# Patient Record
Sex: FEMALE | Race: AMERICAN INDIAN OR ALASKA NATIVE | HISPANIC OR LATINO | Employment: FULL TIME | URBAN - METROPOLITAN AREA
[De-identification: names, ages, dates, MRNs, and addresses within clinical notes are randomized per-mention and may not be internally consistent; named-entity substitution may affect disease eponyms.]

---

## 2023-01-10 ENCOUNTER — TELEPHONE (OUTPATIENT)
Dept: PRIMARY CARE CLINIC | Facility: CLINIC | Age: 28
End: 2023-01-10
Payer: MEDICAID
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
## 2023-03-28 DIAGNOSIS — K80.20 CHOLELITHIASIS: Primary | ICD-10-CM

## 2023-04-26 ENCOUNTER — OFFICE VISIT (OUTPATIENT)
Dept: SURGERY | Facility: CLINIC | Age: 28
End: 2023-04-26
Payer: COMMERCIAL

## 2023-04-26 DIAGNOSIS — K80.20 CALCULUS OF GALLBLADDER WITHOUT CHOLECYSTITIS WITHOUT OBSTRUCTION: ICD-10-CM

## 2023-04-26 PROCEDURE — 99203 PR OFFICE/OUTPT VISIT, NEW, LEVL III, 30-44 MIN: ICD-10-PCS | Mod: S$GLB,,, | Performed by: SURGERY

## 2023-04-26 PROCEDURE — 1160F PR REVIEW ALL MEDS BY PRESCRIBER/CLIN PHARMACIST DOCUMENTED: ICD-10-PCS | Mod: CPTII,S$GLB,, | Performed by: SURGERY

## 2023-04-26 PROCEDURE — 99203 OFFICE O/P NEW LOW 30 MIN: CPT | Mod: S$GLB,,, | Performed by: SURGERY

## 2023-04-26 PROCEDURE — 1159F PR MEDICATION LIST DOCUMENTED IN MEDICAL RECORD: ICD-10-PCS | Mod: CPTII,S$GLB,, | Performed by: SURGERY

## 2023-04-26 PROCEDURE — 1160F RVW MEDS BY RX/DR IN RCRD: CPT | Mod: CPTII,S$GLB,, | Performed by: SURGERY

## 2023-04-26 PROCEDURE — 1159F MED LIST DOCD IN RCRD: CPT | Mod: CPTII,S$GLB,, | Performed by: SURGERY

## 2023-04-26 RX ORDER — PROPRANOLOL HYDROCHLORIDE 60 MG/1
CAPSULE, EXTENDED RELEASE ORAL
COMMUNITY
Start: 2023-02-24 | End: 2023-12-04

## 2023-04-26 RX ORDER — KETOCONAZOLE 20 MG/G
CREAM TOPICAL
COMMUNITY
Start: 2023-03-06

## 2023-04-26 RX ORDER — SUMATRIPTAN 50 MG/1
TABLET, FILM COATED ORAL
COMMUNITY
Start: 2023-04-06 | End: 2024-03-26 | Stop reason: SDUPTHER

## 2023-04-26 RX ORDER — IPRATROPIUM BROMIDE 42 UG/1
SPRAY, METERED NASAL
COMMUNITY
Start: 2023-02-24 | End: 2023-12-04

## 2023-04-26 NOTE — PROGRESS NOTES
History & Physical    SUBJECTIVE:     History of Present Illness:    27-year-old female referred by Dr. Silvio Camilo for symptomatic cholelithiasis.  Patient apparently has had over the last year proximally 6 episodes of right upper quadrant pain with associated nausea and some vomiting.  Denies fever chills or jaundice.  Apparently had ultrasound which showed cholelithiasis.  The ultrasound report is not available at this time with her visit.  We will obtain this report from the referring primary care doctor.    Chief Complaint   Patient presents with    Gall Bladder Problem         Review of patient's allergies indicates:  Review of patient's allergies indicates:   Allergen Reactions    Corticosteroids (glucocorticoids)        Current Outpatient Medications on File Prior to Visit   Medication Sig Dispense Refill    ipratropium (ATROVENT) 42 mcg (0.06 %) nasal spray       ketoconazole (NIZORAL) 2 % cream       propranoloL (INDERAL LA) 60 MG 24 hr capsule       sumatriptan (IMITREX) 50 MG tablet        No current facility-administered medications on file prior to visit.       History reviewed. No pertinent past medical history.  Past Surgical History:   Procedure Laterality Date    PINNING-CLOSED-ARM Right     TONSILLECTOMY       Family History   Problem Relation Age of Onset    Miscarriages / Stillbirths Mother     Hypertension Mother     Stroke Mother     Diabetes Mother     Cancer Mother        Social History     Socioeconomic History    Marital status: Single   Tobacco Use    Smoking status: Never    Smokeless tobacco: Never          Review of Systems   Constitutional: Negative.    Respiratory: Negative.     Cardiovascular: Negative.    Gastrointestinal:  Positive for abdominal pain and vomiting.   Genitourinary: Negative.    Musculoskeletal: Negative.    Neurological:  Positive for headaches.   Endo/Heme/Allergies: Negative.      OBJECTIVE:     There were no vitals filed for this visit.              Physical  Exam:  Physical Exam  Constitutional:       Appearance: Normal appearance.   Eyes:      Pupils: Pupils are equal, round, and reactive to light.   Cardiovascular:      Rate and Rhythm: Normal rate and regular rhythm.   Pulmonary:      Effort: Pulmonary effort is normal.   Abdominal:      General: Abdomen is flat. Bowel sounds are normal.      Palpations: Abdomen is soft.      Tenderness: There is no abdominal tenderness.   Musculoskeletal:         General: No swelling. Normal range of motion.   Skin:     General: Skin is warm.   Neurological:      General: No focal deficit present.      Mental Status: She is alert and oriented to person, place, and time.      Cranial Nerves: No cranial nerve deficit.   Psychiatric:         Mood and Affect: Mood normal.         Behavior: Behavior normal.           ASSESSMENT/PLAN:   Cholelithiasis with biliary colic symptoms  PLAN:  Recommend laparoscopic cholecystectomy.  We will obtain ultrasound report to confirm cholelithiasis.  Patient wants to schedule surgery in December 21st if possible.  I did tell her that if she has worsening symptoms to please call us and we can get surgery done before then any time.  We will plan to see her back about a week before her proposed surgery date and December

## 2023-10-12 ENCOUNTER — TELEPHONE (OUTPATIENT)
Dept: GASTROENTEROLOGY | Facility: CLINIC | Age: 28
End: 2023-10-12
Payer: COMMERCIAL

## 2023-10-12 NOTE — TELEPHONE ENCOUNTER
----- Message from Danni Jay MA sent at 10/11/2023  2:26 PM CDT -----    ----- Message -----  From: Adriana Chaudhary  Sent: 10/11/2023   1:26 PM CDT  To: [facility] Staff    Type:  Patient Returning Call    Who Called:Yasmeen Ren  Who Left Message for Patient:Justyna   Does the patient know what this is regarding?:-  Would the patient rather a call back or a response via [facility]?    Best Call Back Number:[phone]    Additional Information: returning a missed call

## 2023-11-27 ENCOUNTER — TELEPHONE (OUTPATIENT)
Dept: SURGERY | Facility: CLINIC | Age: 28
End: 2023-11-27
Payer: COMMERCIAL

## 2023-11-27 DIAGNOSIS — K80.20 CALCULUS OF GALLBLADDER WITHOUT CHOLECYSTITIS WITHOUT OBSTRUCTION: Primary | ICD-10-CM

## 2023-11-27 NOTE — TELEPHONE ENCOUNTER
Returned call and left message to give office a call back.   ----- Message from Adriana Chaudhary sent at 11/27/2023 10:25 AM CST -----  Type:  Needs Medical Advice    Who Called: Yasmeen Ren    Symptoms (please be specific): -   How long has patient had these symptoms:  -  Pharmacy name and phone #:  -  Would the patient rather a call back or a response via [facility]?    Best Call Back Number: [phone]    Additional Information:  pt needs to speak w/ nurse needs to give update please call

## 2023-12-04 ENCOUNTER — OFFICE VISIT (OUTPATIENT)
Dept: SURGERY | Facility: CLINIC | Age: 28
End: 2023-12-04
Payer: COMMERCIAL

## 2023-12-04 DIAGNOSIS — K80.10 CALCULUS OF GALLBLADDER WITH CHOLECYSTITIS WITHOUT BILIARY OBSTRUCTION, UNSPECIFIED CHOLECYSTITIS ACUITY: Primary | ICD-10-CM

## 2023-12-04 LAB
ALBUMIN SERPL BCP-MCNC: 4.1 G/DL (ref 3.4–5)
ALBUMIN/GLOBULIN RATIO: 1.11 RATIO (ref 1.1–1.8)
ALP SERPL-CCNC: 89 U/L (ref 46–116)
ALT SERPL W P-5'-P-CCNC: 23 U/L (ref 12–78)
ANION GAP SERPL CALC-SCNC: 12 MMOL/L (ref 3–11)
AST SERPL-CCNC: 20 U/L (ref 15–37)
BASOPHILS NFR BLD: 0.5 % (ref 0–3)
BILIRUB CONJ+UNCONJ SERPL-MCNC: 0.5 MG/DL (ref 0–0.7)
BILIRUB SERPL-MCNC: 0.6 MG/DL (ref 0–1)
BILIRUBIN DIRECT+TOT PNL SERPL-MCNC: 0.1 MG/DL (ref 0–0.3)
BUN SERPL-MCNC: 11 MG/DL (ref 7–18)
BUN/CREAT SERPL: 12.79 RATIO (ref 7–18)
CALCIUM SERPL-MCNC: 8.8 MG/DL (ref 8.8–10.5)
CHLORIDE SERPL-SCNC: 101 MMOL/L (ref 100–108)
CO2 SERPL-SCNC: 25 MMOL/L (ref 21–32)
CREAT SERPL-MCNC: 0.86 MG/DL (ref 0.55–1.02)
EOSINOPHIL NFR BLD: 0.8 % (ref 1–3)
ERYTHROCYTE [DISTWIDTH] IN BLOOD BY AUTOMATED COUNT: 13 % (ref 12.5–18)
GFR ESTIMATION: > 60
GLOBULIN: 3.7 G/DL (ref 2.3–3.5)
GLUCOSE SERPL-MCNC: 99 MG/DL (ref 70–110)
HCT VFR BLD AUTO: 43.9 % (ref 37–47)
HGB BLD-MCNC: 14.8 G/DL (ref 12–16)
LYMPHOCYTES NFR BLD: 28.8 % (ref 25–40)
MCH RBC QN AUTO: 29.6 PG (ref 27–31.2)
MCHC RBC AUTO-ENTMCNC: 33.7 G/DL (ref 31.8–35.4)
MCV RBC AUTO: 87.8 FL (ref 80–97)
MONOCYTES NFR BLD: 5.8 % (ref 1–15)
NEUTROPHILS # BLD AUTO: 7.52 10*3/UL (ref 1.8–7.7)
NEUTROPHILS NFR BLD: 63.7 % (ref 37–80)
NUCLEATED RED BLOOD CELLS: 0 %
PLATELETS: 308 10*3/UL (ref 142–424)
POTASSIUM SERPL-SCNC: 3.4 MMOL/L (ref 3.6–5.2)
PROT SERPL-MCNC: 7.8 G/DL (ref 6.4–8.2)
RBC # BLD AUTO: 5 10*6/UL (ref 4.2–5.4)
SODIUM BLD-SCNC: 138 MMOL/L (ref 135–145)
WBC # BLD: 11.8 10*3/UL (ref 4.6–10.2)

## 2023-12-04 PROCEDURE — 99213 PR OFFICE/OUTPT VISIT, EST, LEVL III, 20-29 MIN: ICD-10-PCS | Mod: S$GLB,,, | Performed by: SURGERY

## 2023-12-04 PROCEDURE — 1159F PR MEDICATION LIST DOCUMENTED IN MEDICAL RECORD: ICD-10-PCS | Mod: CPTII,S$GLB,, | Performed by: SURGERY

## 2023-12-04 PROCEDURE — 99213 OFFICE O/P EST LOW 20 MIN: CPT | Mod: S$GLB,,, | Performed by: SURGERY

## 2023-12-04 PROCEDURE — 1160F PR REVIEW ALL MEDS BY PRESCRIBER/CLIN PHARMACIST DOCUMENTED: ICD-10-PCS | Mod: CPTII,S$GLB,, | Performed by: SURGERY

## 2023-12-04 PROCEDURE — 1160F RVW MEDS BY RX/DR IN RCRD: CPT | Mod: CPTII,S$GLB,, | Performed by: SURGERY

## 2023-12-04 PROCEDURE — 1159F MED LIST DOCD IN RCRD: CPT | Mod: CPTII,S$GLB,, | Performed by: SURGERY

## 2023-12-04 RX ORDER — LEVOCETIRIZINE DIHYDROCHLORIDE 5 MG/1
TABLET, FILM COATED ORAL
COMMUNITY
Start: 2023-07-24

## 2023-12-04 RX ORDER — PROMETHAZINE HYDROCHLORIDE 25 MG/1
TABLET ORAL
COMMUNITY
Start: 2023-09-06

## 2023-12-04 NOTE — PROGRESS NOTES
History & Physical    SUBJECTIVE:     History of Present Illness:    28-year-old female whom I saw April this year with symptomatic cholelithiasis.  Her surgery is scheduled for December 21st and she is here today for her preop examination anti answer any questions.  She is been fairly symptomatic since I last saw her having multiple episodes of biliary colic.  No history of jaundice, pancreatitis, fever or chills    Chief Complaint   Patient presents with    Gall Bladder Problem         Review of patient's allergies indicates:  Review of patient's allergies indicates:   Allergen Reactions    Adhesive     Corticosteroids (glucocorticoids)        Current Outpatient Medications on File Prior to Visit   Medication Sig Dispense Refill    levocetirizine (XYZAL) 5 MG tablet       promethazine (PHENERGAN) 25 MG tablet       ketoconazole (NIZORAL) 2 % cream       sumatriptan (IMITREX) 50 MG tablet       [DISCONTINUED] ipratropium (ATROVENT) 42 mcg (0.06 %) nasal spray       [DISCONTINUED] propranoloL (INDERAL LA) 60 MG 24 hr capsule        No current facility-administered medications on file prior to visit.       No past medical history on file.  Past Surgical History:   Procedure Laterality Date    PINNING-CLOSED-ARM Right     TONSILLECTOMY       Family History   Problem Relation Age of Onset    Miscarriages / Stillbirths Mother     Hypertension Mother     Stroke Mother     Diabetes Mother     Cancer Mother        Social History     Socioeconomic History    Marital status: Single   Tobacco Use    Smoking status: Never    Smokeless tobacco: Never          Review of Systems   Constitutional: Negative.    Cardiovascular: Negative.    Gastrointestinal:  Positive for abdominal pain.   Genitourinary: Negative.    Musculoskeletal: Negative.    Neurological: Negative.    Endo/Heme/Allergies: Negative.    Psychiatric/Behavioral: Negative.         OBJECTIVE:     There were no vitals filed for this visit.              Physical  Exam:  Physical Exam  Constitutional:       Appearance: Normal appearance.   HENT:      Head: Normocephalic.   Eyes:      Pupils: Pupils are equal, round, and reactive to light.   Cardiovascular:      Rate and Rhythm: Normal rate and regular rhythm.   Pulmonary:      Effort: Pulmonary effort is normal.      Breath sounds: Normal breath sounds.   Abdominal:      General: Abdomen is flat. Bowel sounds are normal.      Palpations: Abdomen is soft.   Musculoskeletal:         General: No swelling. Normal range of motion.      Cervical back: Normal range of motion.   Skin:     General: Skin is warm and dry.      Coloration: Skin is not jaundiced.   Neurological:      General: No focal deficit present.      Mental Status: She is alert and oriented to person, place, and time.      Cranial Nerves: No cranial nerve deficit.   Psychiatric:         Mood and Affect: Mood normal.         Behavior: Behavior normal.         Thought Content: Thought content normal.             ASSESSMENT/PLAN:   Cholelithiasis with chronic cholecystitis, symptomatic biliary colic  PLAN:  Discussed in detail laparoscopic cholecystectomy in the expected outcome and recovery.  Employment paperwork is being filled out accordingly.  Surgery scheduled for December 21, 2023.  All questions answered

## 2023-12-21 ENCOUNTER — OUTSIDE PLACE OF SERVICE (OUTPATIENT)
Dept: SURGERY | Facility: CLINIC | Age: 28
End: 2023-12-21
Payer: COMMERCIAL

## 2023-12-21 LAB — B-HCG UR QL: NEGATIVE

## 2023-12-21 PROCEDURE — 47562 LAPAROSCOPIC CHOLECYSTECTOMY: CPT | Mod: ,,, | Performed by: SURGERY

## 2023-12-21 PROCEDURE — 47562 PR LAP,CHOLECYSTECTOMY: ICD-10-PCS | Mod: ,,, | Performed by: SURGERY

## 2023-12-29 ENCOUNTER — TELEPHONE (OUTPATIENT)
Dept: PAIN MEDICINE | Facility: CLINIC | Age: 28
End: 2023-12-29
Payer: COMMERCIAL

## 2023-12-29 NOTE — TELEPHONE ENCOUNTER
----- Message from Adriana Chaudhary sent at 12/29/2023 11:57 AM CST -----  Type:  Needs Medical Advice    Who Called: Yasmeen Ren  Symptoms (please be specific): -   How long has patient had these symptoms:  -  Pharmacy name and phone #:  -  Would the patient rather a call back or a response via [facility]?    Best Call Back Number: [phone]    Additional Information:  pt wants to know when her post op appt will be please call

## 2023-12-29 NOTE — TELEPHONE ENCOUNTER
I returned  call to inquire how I could help her? She replied by scheduling her 2 week post op appointment with Dr. Varghese. Which was scheduled with success.

## 2024-01-08 ENCOUNTER — OFFICE VISIT (OUTPATIENT)
Dept: SURGERY | Facility: CLINIC | Age: 29
End: 2024-01-08
Payer: COMMERCIAL

## 2024-01-08 DIAGNOSIS — Z98.890 POST-OPERATIVE STATE: Primary | ICD-10-CM

## 2024-01-08 PROCEDURE — 1159F MED LIST DOCD IN RCRD: CPT | Mod: CPTII,S$GLB,, | Performed by: SURGERY

## 2024-01-08 PROCEDURE — 1160F RVW MEDS BY RX/DR IN RCRD: CPT | Mod: CPTII,S$GLB,, | Performed by: SURGERY

## 2024-01-08 PROCEDURE — 99024 POSTOP FOLLOW-UP VISIT: CPT | Mod: S$GLB,,, | Performed by: SURGERY

## 2024-01-08 NOTE — PROGRESS NOTES
HPI:  Postop revisit status post laparoscopic cholecystectomy.  Still complains of some incisional epigastric pain.  Appetite not as good as it was.  No nausea vomiting or diarrhea.    PHYSICAL EXAM:  Abdomen is soft and nontender with active bowel sounds.  Incisions are healing well.  Subcuticular suture has come out and is no longer present  ASSESSMENT:    Stable postop  PLAN:      Revisit as needed.  Return to work Tuesday January 23rd no restriction

## 2024-03-26 ENCOUNTER — OFFICE VISIT (OUTPATIENT)
Dept: PRIMARY CARE CLINIC | Facility: CLINIC | Age: 29
End: 2024-03-26
Payer: COMMERCIAL

## 2024-03-26 VITALS
HEART RATE: 89 BPM | WEIGHT: 167.31 LBS | TEMPERATURE: 99 F | RESPIRATION RATE: 18 BRPM | BODY MASS INDEX: 33.73 KG/M2 | SYSTOLIC BLOOD PRESSURE: 102 MMHG | DIASTOLIC BLOOD PRESSURE: 71 MMHG | OXYGEN SATURATION: 98 % | HEIGHT: 59 IN

## 2024-03-26 DIAGNOSIS — J30.2 SEASONAL ALLERGIES: ICD-10-CM

## 2024-03-26 DIAGNOSIS — Z00.00 ANNUAL PHYSICAL EXAM: Primary | ICD-10-CM

## 2024-03-26 DIAGNOSIS — Z13.1 DIABETES MELLITUS SCREENING: ICD-10-CM

## 2024-03-26 DIAGNOSIS — R35.0 URINE FREQUENCY: ICD-10-CM

## 2024-03-26 DIAGNOSIS — Z11.4 SCREENING FOR HIV (HUMAN IMMUNODEFICIENCY VIRUS): ICD-10-CM

## 2024-03-26 DIAGNOSIS — G43.711 CHRONIC MIGRAINE WITHOUT AURA, WITH INTRACTABLE MIGRAINE, SO STATED, WITH STATUS MIGRAINOSUS: Chronic | ICD-10-CM

## 2024-03-26 DIAGNOSIS — E55.9 VITAMIN D DEFICIENCY: ICD-10-CM

## 2024-03-26 DIAGNOSIS — Z13.220 SCREENING CHOLESTEROL LEVEL: ICD-10-CM

## 2024-03-26 DIAGNOSIS — Z11.59 NEED FOR HEPATITIS C SCREENING TEST: ICD-10-CM

## 2024-03-26 DIAGNOSIS — Z13.29 THYROID DISORDER SCREEN: ICD-10-CM

## 2024-03-26 PROCEDURE — 3078F DIAST BP <80 MM HG: CPT | Mod: CPTII,S$GLB,, | Performed by: NURSE PRACTITIONER

## 2024-03-26 PROCEDURE — 3074F SYST BP LT 130 MM HG: CPT | Mod: CPTII,S$GLB,, | Performed by: NURSE PRACTITIONER

## 2024-03-26 PROCEDURE — 99204 OFFICE O/P NEW MOD 45 MIN: CPT | Mod: S$GLB,,, | Performed by: NURSE PRACTITIONER

## 2024-03-26 PROCEDURE — 3008F BODY MASS INDEX DOCD: CPT | Mod: CPTII,S$GLB,, | Performed by: NURSE PRACTITIONER

## 2024-03-26 PROCEDURE — 1159F MED LIST DOCD IN RCRD: CPT | Mod: CPTII,S$GLB,, | Performed by: NURSE PRACTITIONER

## 2024-03-26 PROCEDURE — 1160F RVW MEDS BY RX/DR IN RCRD: CPT | Mod: CPTII,S$GLB,, | Performed by: NURSE PRACTITIONER

## 2024-03-26 RX ORDER — SUMATRIPTAN 50 MG/1
50 TABLET, FILM COATED ORAL DAILY PRN
Qty: 9 TABLET | Refills: 1 | Status: SHIPPED | OUTPATIENT
Start: 2024-03-26

## 2024-03-26 RX ORDER — IPRATROPIUM BROMIDE 42 UG/1
SPRAY, METERED NASAL
COMMUNITY
Start: 2023-12-22

## 2024-03-26 NOTE — PROGRESS NOTES
Subjective:       Patient ID: Yasmeen Ren is a 28 y.o. female.    Chief Complaint: Establish Care (Pt c/o migraines. )    HPI: Yasmeen is a 28 y.o. female who presents to establish care with a new PCP. Previously seen by a PCP at [facility].    Ms. Ren suffers from chronic migraines she says since she was a child which she takes imitrex as needed. Her migraines have increased in pain and are now occurring more often so this has caused her to miss work on several occasions which she works at [employer] and the imitrex only helps some she says so she would like to see a Neurologist for further evaluation.    Also with seasonal allergies which is controlled with xyzal and a nasal spray.    She is followed by Dr. Arana for OB/GYN care. PAP UTD. She has a nexplanon for birth control.    Denies smoking or drinking.                   History reviewed. No pertinent past medical history.    Past Surgical History:   Procedure Laterality Date    CHOLECYSTECTOMY      PINNING-CLOSED-ARM Right     TONSILLECTOMY         Family History   Problem Relation Age of Onset    Miscarriages / Stillbirths Mother     Hypertension Mother     Stroke Mother     Diabetes Mother     Cancer Mother        Social History     Tobacco Use    Smoking status: Some Days     Types: Vaping with nicotine    Smokeless tobacco: Never   Substance Use Topics    Alcohol use: Not Currently       There is no problem list on file for this patient.      Immunization History   Administered Date(s) Administered    DTaP 1994, 01/09/1995, 02/24/1995, 11/27/1995, 08/25/1998    Hepatitis A, Pediatric/Adolescent, 2 Dose 08/10/2012    Hepatitis B, Pediatric/Adolescent 1994, 1994, 02/24/1995    IPV 1994, 01/09/1995, 02/24/1995, 11/27/1995, 08/25/1998    Influenza - Trivalent - PF (ADULT) 09/30/2016    MMR 08/25/1995, 08/25/1998    Meningococcal Conjugate (MCV4P) 08/10/2012    Tdap 10/05/2006, 07/28/2016    Varicella 03/26/1999, 08/10/2012  "          Review of Systems   Constitutional:  Positive for fatigue. Negative for activity change, appetite change, chills, diaphoresis and fever.   HENT:  Negative for congestion, ear pain, sinus pain, tinnitus and trouble swallowing.    Eyes:  Negative for visual disturbance.   Respiratory:  Negative for cough, chest tightness, shortness of breath and wheezing.    Cardiovascular:  Negative for chest pain, palpitations and leg swelling.   Gastrointestinal:  Positive for nausea. Negative for abdominal distention, abdominal pain, blood in stool, constipation, diarrhea and vomiting.   Endocrine: Negative for cold intolerance, heat intolerance, polydipsia, polyphagia and polyuria.   Genitourinary:  Negative for decreased urine volume, dysuria, frequency, hematuria and urgency.   Musculoskeletal:  Negative for back pain, gait problem and neck pain.   Skin:  Negative for color change and rash.   Allergic/Immunologic: Positive for environmental allergies.   Neurological:  Positive for headaches (chronic migraines). Negative for dizziness, syncope, weakness, light-headedness and numbness.   Hematological:  Negative for adenopathy. Does not bruise/bleed easily.   Psychiatric/Behavioral:  Negative for behavioral problems, confusion and dysphoric mood. The patient is not nervous/anxious.      Objective:     Vitals:    03/26/24 1012   BP: 102/71   BP Location: Right arm   Patient Position: Sitting   BP Method: Large (Automatic)   Pulse: 89   Resp: 18   Temp: 98.5 °F (36.9 °C)   TempSrc: Oral   SpO2: 98%   Weight: 75.9 kg (167 lb 4.8 oz)   Height: 4' 11" (1.499 m)       Physical Exam  Vitals and nursing note reviewed.   Constitutional:       General: She is not in acute distress.     Appearance: Normal appearance. She is not diaphoretic.   HENT:      Head: Normocephalic and atraumatic.      Nose: Nose normal.      Mouth/Throat:      Mouth: Mucous membranes are moist.      Pharynx: Oropharynx is clear.   Eyes:      General:    "      Right eye: No discharge.         Left eye: No discharge.      Extraocular Movements: Extraocular movements intact.      Conjunctiva/sclera: Conjunctivae normal.      Pupils: Pupils are equal, round, and reactive to light.   Neck:      Thyroid: No thyromegaly or thyroid tenderness.   Cardiovascular:      Rate and Rhythm: Normal rate and regular rhythm.      Pulses: Normal pulses.      Heart sounds: Normal heart sounds.   Pulmonary:      Effort: Pulmonary effort is normal.      Breath sounds: Normal breath sounds. No stridor. No decreased breath sounds, wheezing, rhonchi or rales.   Abdominal:      General: Bowel sounds are normal. There is no distension.      Palpations: Abdomen is soft.      Tenderness: There is no abdominal tenderness. There is no guarding.   Musculoskeletal:         General: No tenderness. Normal range of motion.      Cervical back: Normal range of motion and neck supple.      Right lower leg: No edema.      Left lower leg: No edema.   Lymphadenopathy:      Cervical: No cervical adenopathy.   Skin:     General: Skin is warm and dry.      Capillary Refill: Capillary refill takes less than 2 seconds.      Findings: No rash.   Neurological:      General: No focal deficit present.      Mental Status: She is alert and oriented to person, place, and time.      Cranial Nerves: Cranial nerves 2-12 are intact.      Sensory: Sensation is intact.      Motor: Motor function is intact.      Coordination: Coordination is intact.      Gait: Gait is intact.   Psychiatric:         Mood and Affect: Mood and affect normal.         Speech: Speech normal.         Behavior: Behavior normal. Behavior is cooperative.         Thought Content: Thought content normal.         Cognition and Memory: Cognition and memory normal.         Judgment: Judgment normal.         Orders Only on 12/21/2023   Component Date Value Ref Range Status    Preg Test, Ur 12/21/2023 Negative  Negative Final   Orders Only on 12/04/2023    Component Date Value Ref Range Status    Total Bilirubin 12/04/2023 0.6  0.0 - 1.0 mg/dL Final    Bilirubin, Direct 12/04/2023 0.1  0.0 - 0.3 mg/dL Final    Bilirubin, Indirect 12/04/2023 0.5  0.0 - 0.7 mg/dL Final    AST 12/04/2023 20  15 - 37 U/L Final    ALT 12/04/2023 23  12 - 78 U/L Final    Total Protein 12/04/2023 7.8  6.4 - 8.2 g/dL Final    Albumin 12/04/2023 4.1  3.4 - 5.0 g/dL Final    Globulin 12/04/2023 3.7 (H)  2.3 - 3.5 g/dL Final    Albumin/Globulin Ratio 12/04/2023 1.108  1.1 - 1.8 Ratio Final    Alkaline Phosphatase 12/04/2023 89  46 - 116 U/L Final   Orders Only on 12/04/2023   Component Date Value Ref Range Status    Sodium 12/04/2023 138  135 - 145 mmol/L Final    Potassium 12/04/2023 3.4 (L)  3.6 - 5.2 mmol/L Final    Chloride 12/04/2023 101  100 - 108 mmol/L Final    CO2 12/04/2023 25  21 - 32 mmol/L Final    Anion Gap 12/04/2023 12.0 (H)  3.0 - 11.0 mmol/L Final    BUN 12/04/2023 11  7 - 18 mg/dL Final    Creatinine 12/04/2023 0.86  0.55 - 1.02 mg/dL Final    GFR ESTIMATION 12/04/2023 > 60  >60 Final    BUN/Creatinine Ratio 12/04/2023 12.79  7 - 18 Ratio Final    Glucose 12/04/2023 99  70 - 110 mg/dL Final    Calcium 12/04/2023 8.8  8.8 - 10.5 mg/dL Final   Orders Only on 12/04/2023   Component Date Value Ref Range Status    WBC 12/04/2023 11.8 (H)  4.6 - 10.2 10*3/uL Final    RBC 12/04/2023 5.00  4.2 - 5.4 10*6/uL Final    Hemoglobin 12/04/2023 14.8  12.0 - 16.0 g/dL Final    Hematocrit 12/04/2023 43.9  37.0 - 47.0 % Final    MCV 12/04/2023 87.8  80 - 97 fL Final    MCH 12/04/2023 29.6  27.0 - 31.2 pg Final    MCHC 12/04/2023 33.7  31.8 - 35.4 g/dL Final    RDW RBC Auto-Rto 12/04/2023 13.0  12.5 - 18.0 % Final    Platelets 12/04/2023 308  142 - 424 10*3/uL Final    Neutrophils 12/04/2023 63.7  37 - 80 % Final    Lymphocytes 12/04/2023 28.8  25 - 40 % Final    Monocytes 12/04/2023 5.8  1 - 15 % Final    Eosinophils 12/04/2023 0.8 (L)  1 - 3 % Final    Basophils 12/04/2023 0.5  0 - 3 % Final     nRBC# 12/04/2023 0.0  % Final    Neutrophils Absolute 12/04/2023 7.52  1.8 - 7.7 10*3/uL Final         Assessment:      1. Annual physical exam    2. Chronic migraine without aura, with intractable migraine, so stated, with status migrainosus    3. Seasonal allergies    4. Diabetes mellitus screening    5. Screening cholesterol level    6. Thyroid disorder screen    7. Urine frequency    8. Vitamin D deficiency    9. Screening for HIV (human immunodeficiency virus)    10. Need for hepatitis C screening test          Plan:     Annual physical exam  Comments:  Will review labs and determine POC based on results  Orders:  -     CBC Auto Differential; Future; Expected date: 03/26/2024  -     Comprehensive Metabolic Panel; Future; Expected date: 03/26/2024  -     Hemoglobin A1C; Future; Expected date: 03/26/2024  -     Lipid Panel; Future; Expected date: 03/26/2024  -     TSH; Future; Expected date: 03/26/2024    Chronic migraine without aura, with intractable migraine, so stated, with status migrainosus  Comments:  imitrex refilledm continue using as needed  Orders:  -     Ambulatory referral/consult to Neurology; Future; Expected date: 04/02/2024  -     sumatriptan (IMITREX) 50 MG tablet; Take 1 tablet (50 mg total) by mouth daily as needed for Migraine (may repeat in 2 hours if needed).  Dispense: 9 tablet; Refill: 1    Seasonal allergies    Diabetes mellitus screening  -     Hemoglobin A1C; Future; Expected date: 03/26/2024    Screening cholesterol level  -     Lipid Panel; Future; Expected date: 03/26/2024    Thyroid disorder screen  -     T4, Free; Future; Expected date: 03/26/2024  -     TSH; Future; Expected date: 03/26/2024    Urine frequency  -     Urinalysis, Reflex to Urine Culture Urine, Clean Catch; Future; Expected date: 03/26/2024    Vitamin D deficiency  -     Vitamin D; Future; Expected date: 03/26/2024    Screening for HIV (human immunodeficiency virus)  -     HIV 1/2 Ag/Ab (4th Gen); Future;  Expected date: 03/26/2024    Need for hepatitis C screening test  -     Hepatitis C Antibody; Future; Expected date: 03/26/2024         Current Outpatient Medications   Medication Sig Dispense Refill    ipratropium (ATROVENT) 42 mcg (0.06 %) nasal spray       ketoconazole (NIZORAL) 2 % cream       levocetirizine (XYZAL) 5 MG tablet       promethazine (PHENERGAN) 25 MG tablet       sumatriptan (IMITREX) 50 MG tablet Take 1 tablet (50 mg total) by mouth daily as needed for Migraine (may repeat in 2 hours if needed). 9 tablet 1     No current facility-administered medications for this visit.       Medications Discontinued During This Encounter   Medication Reason    sumatriptan (IMITREX) 50 MG tablet Reorder       Health Maintenance   Topic Date Due    Hepatitis C Screening  Never done    Lipid Panel  Never done    Pap Smear  Never done    TETANUS VACCINE  07/28/2026       Patient Instructions   RTC in 2 months for F/U or sooner if needed.    Patient Education       Yearly Physical for Adults   About this topic   Most people do not want to be sick. Having a checkup each year with your doctor is one way to help you stay healthy. You may need to see your doctor more or less often. How often you need to go to the doctor depends on your age. Your family and medical history also play a role in how often you need to go to the doctor. Going to see your doctor on a routine basis can help you find problems early or even before they start. This may make it easier to treat or cure your problem.  General   Your doctor will talk about many things during your checkup. Your doctor may ask about:  Your medical and family history.  All the drugs you are taking. Be sure to include all prescription, over the counter, and herbal supplements. Tell the doctor if you have any drug allergy. Bring a list of drugs you take with you.  How you are feeling and if you are having any problems.  Risky behaviors like smoking, drinking alcohol, using  illegal drugs, not wearing seatbelts, having unprotected sex, etc.  Your doctor will do a physical exam and may check your:  Height and weight  Blood pressure  Reflexes  Memory  Vision  Hearing  Your doctor may order:  Lab tests  ECG to check your heart rhythm  X-rays  Tests or treatments based on your exam  What lifestyle changes are needed?   Your doctor may suggest you make changes to your lifestyle at this visit. The doctor may talk with you about being more active or lowering stress levels. Ask your doctor what you need to do.  What drugs may be needed?   Your doctor may order drugs or vaccines to protect you from illnesses.  What changes to diet are needed?   Talk to your doctor to see if any changes are needed to your diet.  When do I need to call the doctor?   Call your doctor if you need to learn about any test results. Together you can make a plan for more care.  Helpful tips   Make a list of questions for your doctor before you go. This will help you remember to ask about any concerns. Write down any answers from your doctor so you can look over them after your visit.   Tell your doctor about any changes in your body or health since your last visit.  Ask your doctor about any screening tests you need.  Where can I learn more?   American Academy of Family Physicians  http://familydoctor.org/familydoctor/en/prevention-wellness/staying-healthy/healthy-living/preventive-services-for-healthy-living.printerview.html   Centers for Disease Control  http://www.cdc.gov/family/checkup/   Last Reviewed Date   2019-04-22  Consumer Information Use and Disclaimer   This information is not specific medical advice and does not replace information you receive from your health care provider. This is only a brief summary of general information. It does NOT include all information about conditions, illnesses, injuries, tests, procedures, treatments, therapies, discharge instructions or life-style choices that may apply to  "you. You must talk with your health care provider for complete information about your health and treatment options. This information should not be used to decide whether or not to accept your health care providers advice, instructions or recommendations. Only your health care provider has the knowledge and training to provide advice that is right for you.  Copyright   Copyright © 2021 UpToDate, Inc. and its affiliates and/or licensors. All rights reserved.  Patient Education       Migraines in Adults   The Basics   Written by the doctors and editors at [employer]   What are migraines? -- Migraines are a kind of headache that can also involve other symptoms. Migraines can affect both adults and children. They are more common in women than in men. Migraines often start off mild and then get worse.  What are the symptoms of migraines in adults? -- Symptoms can include:  Headache - The headache gets worse over several hours and is usually throbbing. It often affects 1 side of the head.  Nausea and sometimes vomiting  Feeling sensitive to light and noise - Lying down in a quiet, dark room often helps.  Aura - Some people have something called a migraine "aura." An aura is a symptom or feeling that happens before or during the migraine headache. Each person's aura is different, but in most cases the aura affects the vision. You might see flashing lights, bright spots, or zig-zag lines, or lose part of your vision. Or you might have numbness and tingling of the lips, lower face, and fingers of 1 hand. Some people hear sounds or have ringing in their ears as part of their aura. The aura usually lasts a few minutes to an hour and then goes away, but most often lasts 15 to 30 minutes.  Women who get migraines with aura usually cannot take birth control pills. That's because they might increase the risk of stroke.  Many people get other symptoms of migraine that happen several hours or even a day before the headache. Doctors " "call these "premonitory" or "prodromal" symptoms. They might include yawning, feeling depressed, irritability, food cravings, constipation, or a stiff neck.  Is there a test for migraines? -- No. There is no test. But your doctor should be able to tell if you have migraines by doing an exam and learning about your symptoms.  Should I see a doctor or nurse? -- Yes. If you think you are having migraines, you should talk to your doctor or nurse. You should also see a doctor or nurse if your migraines get worse or more frequent, or if you have new symptoms.  Is there anything I can do to prevent migraines? -- Yes. Some people find that their migraines are triggered by certain things. If you can avoid some of these things, you can lower your chances of getting migraines.  You can also keep a "headache calendar." In the calendar, write down every time you have a migraine and what you ate and did before it started. That way you can find out if there is anything you should avoid eating or doing. You can also write down what medicine you took and whether or not it helped.  Common migraine triggers include:  Stress  Hormonal changes  Skipping meals or not eating enough  Changes in the weather  Sleeping too much or too little  Bright or flashing lights  Drinking alcohol  Certain drinks or foods, such as red wine, aged cheese, and hot dogs  If your migraines are frequent or severe, your doctor can suggest others ways to help prevent them. For example, it might help to learn relaxation techniques and ways to manage stress. There are also medicines that can help.  Some women get migraines just before or during their period. Medicine can help with this, too.  How are migraines treated? -- There are many different medicines that can help with migraines. Your doctor can help you find the best treatment for your situation.  For mild migraines, your doctor might suggest an over-the-counter medicine such as acetaminophen (sample brand " name: Tylenol), ibuprofen (sample brand names: Advil, Motrin), or naproxen (sample brand name: Aleve). There is also a medicine that combines acetaminophen, aspirin, and caffeine (sample brand name: Excedrin).  For more severe migraines, there are prescription medicines that can help. Some, such as medicines called triptans, help to relieve the pain from a migraine attack. Other prescription medicines can help to make migraine attacks happen less often. If you have severe nausea or vomiting with your migraines, there are medicines that can help with that, too.   Do not try to treat frequent migraines on your own with non-prescription pain medicines. Taking non-prescription pain medicines too often can actually cause more headaches later.  What if I want to get pregnant? -- If you want to get pregnant, talk to your doctor or nurse about it before you start trying. Some medicines used to treat and prevent migraines are not safe during pregnancy, so you might need to switch medicines before you get pregnant.  Some women notice that their migraines actually get better during pregnancy and breastfeeding. This is related to hormonal changes in the body.  All topics are updated as new evidence becomes available and our peer review process is complete.  This topic retrieved from [employer] on: Sep 21, 2021.  Topic 585298 Version 5.0  Release: 29.4.2 - C29.263  © 2021 UpToDate, Inc. and/or its affiliates. All rights reserved.  Consumer Information Use and Disclaimer   This information is not specific medical advice and does not replace information you receive from your health care provider. This is only a brief summary of general information. It does NOT include all information about conditions, illnesses, injuries, tests, procedures, treatments, therapies, discharge instructions or life-style choices that may apply to you. You must talk with your health care provider for complete information about your health and treatment  options. This information should not be used to decide whether or not to accept your health care provider's advice, instructions or recommendations. Only your health care provider has the knowledge and training to provide advice that is right for you. The use of this information is governed by the [employer] End User License Agreement, available at https://www.ideasoft/en/solutions/[employer]/about/kenji.The use of [employer] content is governed by the [employer] Terms of Use. ©2021 UpToDate, Inc. All rights reserved.  Copyright   © 2021 UpToDate, Inc. and/or its affiliates. All rights reserved.          Risks, benefits, and alternatives discussed with patient, Patient verbalized understanding of discussed plan of care. Asked patient if any further questions, answered no.    Future Appointments   Date Time Provider Department [place]   5/27/2024  3:00 PM Kavtia Reaves NP [facility]              Kavita Reaves NP

## 2024-03-26 NOTE — PATIENT INSTRUCTIONS
RTC in 2 months for F/U or sooner if needed.    Patient Education       Yearly Physical for Adults   About this topic   Most people do not want to be sick. Having a checkup each year with your doctor is one way to help you stay healthy. You may need to see your doctor more or less often. How often you need to go to the doctor depends on your age. Your family and medical history also play a role in how often you need to go to the doctor. Going to see your doctor on a routine basis can help you find problems early or even before they start. This may make it easier to treat or cure your problem.  General   Your doctor will talk about many things during your checkup. Your doctor may ask about:  Your medical and family history.  All the drugs you are taking. Be sure to include all prescription, over the counter, and herbal supplements. Tell the doctor if you have any drug allergy. Bring a list of drugs you take with you.  How you are feeling and if you are having any problems.  Risky behaviors like smoking, drinking alcohol, using illegal drugs, not wearing seatbelts, having unprotected sex, etc.  Your doctor will do a physical exam and may check your:  Height and weight  Blood pressure  Reflexes  Memory  Vision  Hearing  Your doctor may order:  Lab tests  ECG to check your heart rhythm  X-rays  Tests or treatments based on your exam  What lifestyle changes are needed?   Your doctor may suggest you make changes to your lifestyle at this visit. The doctor may talk with you about being more active or lowering stress levels. Ask your doctor what you need to do.  What drugs may be needed?   Your doctor may order drugs or vaccines to protect you from illnesses.  What changes to diet are needed?   Talk to your doctor to see if any changes are needed to your diet.  When do I need to call the doctor?   Call your doctor if you need to learn about any test results. Together you can make a plan for more care.  Helpful tips   Make a  list of questions for your doctor before you go. This will help you remember to ask about any concerns. Write down any answers from your doctor so you can look over them after your visit.   Tell your doctor about any changes in your body or health since your last visit.  Ask your doctor about any screening tests you need.  Where can I learn more?   American Academy of Family Physicians  http://familydoctor.org/familydoctor/en/prevention-wellness/staying-healthy/healthy-living/preventive-services-for-healthy-living.printerview.html   Centers for Disease Control  http://www.cdc.gov/family/checkup/   Last Reviewed Date   2019-04-22  Consumer Information Use and Disclaimer   This information is not specific medical advice and does not replace information you receive from your health care provider. This is only a brief summary of general information. It does NOT include all information about conditions, illnesses, injuries, tests, procedures, treatments, therapies, discharge instructions or life-style choices that may apply to you. You must talk with your health care provider for complete information about your health and treatment options. This information should not be used to decide whether or not to accept your health care providers advice, instructions or recommendations. Only your health care provider has the knowledge and training to provide advice that is right for you.  Copyright   Copyright © 2021 UpToDate, Inc. and its affiliates and/or licensors. All rights reserved.  Patient Education       Migraines in Adults   The Basics   Written by the doctors and editors at [facility]   What are migraines? -- Migraines are a kind of headache that can also involve other symptoms. Migraines can affect both adults and children. They are more common in women than in men. Migraines often start off mild and then get worse.  What are the symptoms of migraines in adults? -- Symptoms can include:  Headache - The headache gets  "worse over several hours and is usually throbbing. It often affects 1 side of the head.  Nausea and sometimes vomiting  Feeling sensitive to light and noise - Lying down in a quiet, dark room often helps.  Aura - Some people have something called a migraine "aura." An aura is a symptom or feeling that happens before or during the migraine headache. Each person's aura is different, but in most cases the aura affects the vision. You might see flashing lights, bright spots, or zig-zag lines, or lose part of your vision. Or you might have numbness and tingling of the lips, lower face, and fingers of 1 hand. Some people hear sounds or have ringing in their ears as part of their aura. The aura usually lasts a few minutes to an hour and then goes away, but most often lasts 15 to 30 minutes.  Women who get migraines with aura usually cannot take birth control pills. That's because they might increase the risk of stroke.  Many people get other symptoms of migraine that happen several hours or even a day before the headache. Doctors call these "premonitory" or "prodromal" symptoms. They might include yawning, feeling depressed, irritability, food cravings, constipation, or a stiff neck.  Is there a test for migraines? -- No. There is no test. But your doctor should be able to tell if you have migraines by doing an exam and learning about your symptoms.  Should I see a doctor or nurse? -- Yes. If you think you are having migraines, you should talk to your doctor or nurse. You should also see a doctor or nurse if your migraines get worse or more frequent, or if you have new symptoms.  Is there anything I can do to prevent migraines? -- Yes. Some people find that their migraines are triggered by certain things. If you can avoid some of these things, you can lower your chances of getting migraines.  You can also keep a "headache calendar." In the calendar, write down every time you have a migraine and what you ate and did before " it started. That way you can find out if there is anything you should avoid eating or doing. You can also write down what medicine you took and whether or not it helped.  Common migraine triggers include:  Stress  Hormonal changes  Skipping meals or not eating enough  Changes in the weather  Sleeping too much or too little  Bright or flashing lights  Drinking alcohol  Certain drinks or foods, such as red wine, aged cheese, and hot dogs  If your migraines are frequent or severe, your doctor can suggest others ways to help prevent them. For example, it might help to learn relaxation techniques and ways to manage stress. There are also medicines that can help.  Some women get migraines just before or during their period. Medicine can help with this, too.  How are migraines treated? -- There are many different medicines that can help with migraines. Your doctor can help you find the best treatment for your situation.  For mild migraines, your doctor might suggest an over-the-counter medicine such as acetaminophen (sample brand name: Tylenol), ibuprofen (sample brand names: Advil, Motrin), or naproxen (sample brand name: Aleve). There is also a medicine that combines acetaminophen, aspirin, and caffeine (sample brand name: Excedrin).  For more severe migraines, there are prescription medicines that can help. Some, such as medicines called triptans, help to relieve the pain from a migraine attack. Other prescription medicines can help to make migraine attacks happen less often. If you have severe nausea or vomiting with your migraines, there are medicines that can help with that, too.   Do not try to treat frequent migraines on your own with non-prescription pain medicines. Taking non-prescription pain medicines too often can actually cause more headaches later.  What if I want to get pregnant? -- If you want to get pregnant, talk to your doctor or nurse about it before you start trying. Some medicines used to treat and  prevent migraines are not safe during pregnancy, so you might need to switch medicines before you get pregnant.  Some women notice that their migraines actually get better during pregnancy and breastfeeding. This is related to hormonal changes in the body.  All topics are updated as new evidence becomes available and our peer review process is complete.  This topic retrieved from [employer] on: Sep 21, 2021.  Topic 362297 Version 5.0  Release: 29.4.2 - C29.263  © 2021 UpToDate, Inc. and/or its affiliates. All rights reserved.  Consumer Information Use and Disclaimer   This information is not specific medical advice and does not replace information you receive from your health care provider. This is only a brief summary of general information. It does NOT include all information about conditions, illnesses, injuries, tests, procedures, treatments, therapies, discharge instructions or life-style choices that may apply to you. You must talk with your health care provider for complete information about your health and treatment options. This information should not be used to decide whether or not to accept your health care provider's advice, instructions or recommendations. Only your health care provider has the knowledge and training to provide advice that is right for you. The use of this information is governed by the [employer] End User License Agreement, available at https://www.Daio.Eloqua/en/solutions/Zipnosis/about/kenji.The use of [employer] content is governed by the [employer] Terms of Use. ©2021 UpToDate, Inc. All rights reserved.  Copyright   © 2021 UpToDate, Inc. and/or its affiliates. All rights reserved.

## 2024-03-26 NOTE — LETTER
March 26, 2024      [facility]  [address] [place] [address], [address]  [facility] [address]  Phone: [phone]  Fax: [phone]       Patient: Yasmeen Ren   YOB: 1995  Date of Visit: 03/26/2024    To Whom It May Concern:    Sammie Ren  was at [facility] on 03/26/2024. Ms. Ren is establishing care with me today as her new PCP. She suffers from chronic migraines which have caused her great pain, dizziness and nausea so I am referring her to a Neurologist for further evaluation of her migraines. Due to today being my first visit seeing Ms. Ren as a patient, I am unable to complete [facility] paperwork for her job. If you have any questions or concerns, or if I can be of further assistance, please do not hesitate to contact me.    Sincerely,        Sharmaine Reaves NP

## 2024-03-27 ENCOUNTER — TELEPHONE (OUTPATIENT)
Dept: PRIMARY CARE CLINIC | Facility: CLINIC | Age: 29
End: 2024-03-27
Payer: COMMERCIAL

## 2024-03-27 NOTE — TELEPHONE ENCOUNTER
----- Message from Rosa Maria Thomas sent at 3/27/2024 11:35 AM CDT -----  Contact: self  Patient requesting a call back regarding getting a work release filled out. Pt states she is not able to return to work until it is filled out. Please call back @ [phone]

## 2024-03-27 NOTE — TELEPHONE ENCOUNTER
----- Message from Noreen Hayward sent at 3/27/2024  8:23 AM CDT -----  Contact: PATIENT  Type:  Patient Call Back    Who Called:Yasmeen Ren   Does the patient know what this is regarding?:N/A  Would the patient rather a call back or a response via [facility]? CALL BACK  Best Call Back Number:[phone]   Additional Information: N/A

## 2024-04-08 ENCOUNTER — TELEPHONE (OUTPATIENT)
Dept: PRIMARY CARE CLINIC | Facility: CLINIC | Age: 29
End: 2024-04-08
Payer: COMMERCIAL

## 2024-04-08 NOTE — TELEPHONE ENCOUNTER
S/W Dr. Alves's office conf referral has been received. Advised pt to give their office a call to schedule. JAREK

## 2024-04-08 NOTE — TELEPHONE ENCOUNTER
"S/W Minoo at Dr. Alves's office,a dvised her the referral has been sent 4x w/ conf received. Offered to fax over confirmations, it was denied. She states "once we have it we will give her a call". VU.   "

## 2024-04-08 NOTE — TELEPHONE ENCOUNTER
----- Message from Barbra Sheridan sent at 4/8/2024 11:48 AM CDT -----  Contact: Yasmeen Arana is calling regards to no one has called her about the Neurology referral and when she tried to call them to schedule they advise her that a referral was never received. Please call her at  [phone].    Thanks  td

## 2024-04-15 ENCOUNTER — TELEPHONE (OUTPATIENT)
Dept: PRIMARY CARE CLINIC | Facility: CLINIC | Age: 29
End: 2024-04-15
Payer: COMMERCIAL

## 2024-04-15 NOTE — TELEPHONE ENCOUNTER
----- Message from Rae Caicedo sent at 4/15/2024 11:20 AM CDT -----  Contact: self  Type:  Same Day Appointment Request    Caller is requesting a same day appointment.  Caller declined first available appointment listed below.    Name of Caller:Yasmeen Ren  When is the first available appointment?4/16  Symptoms:mental health  Best Call Back Number:[phone]  Additional Information: n/a

## 2024-04-15 NOTE — TELEPHONE ENCOUNTER
----- Message from Racheal York sent at 4/15/2024  3:04 PM CDT -----  Contact: self  Type: Staff Message    Caller: Yasmeen Murdockos  Call Back Number: [phone]  Nature of the Call: #pt returning a missed call and was told to call the office back. No answer  Additional Information: na

## 2024-05-06 ENCOUNTER — E-VISIT (OUTPATIENT)
Dept: PRIMARY CARE CLINIC | Facility: CLINIC | Age: 29
End: 2024-05-06
Payer: COMMERCIAL

## 2024-05-06 ENCOUNTER — TELEPHONE (OUTPATIENT)
Dept: PRIMARY CARE CLINIC | Facility: CLINIC | Age: 29
End: 2024-05-06

## 2024-05-06 DIAGNOSIS — F32.A ANXIETY AND DEPRESSION: Primary | ICD-10-CM

## 2024-05-06 DIAGNOSIS — F41.9 ANXIETY AND DEPRESSION: Primary | ICD-10-CM

## 2024-05-06 DIAGNOSIS — G43.711 CHRONIC MIGRAINE WITHOUT AURA, WITH INTRACTABLE MIGRAINE, SO STATED, WITH STATUS MIGRAINOSUS: Primary | ICD-10-CM

## 2024-05-06 PROCEDURE — 99421 OL DIG E/M SVC 5-10 MIN: CPT | Mod: ,,, | Performed by: NURSE PRACTITIONER

## 2024-05-06 RX ORDER — PROPRANOLOL HYDROCHLORIDE 40 MG/1
40 TABLET ORAL 2 TIMES DAILY
Qty: 60 TABLET | Refills: 1 | Status: SHIPPED | OUTPATIENT
Start: 2024-05-06

## 2024-05-06 RX ORDER — ESCITALOPRAM OXALATE 5 MG/1
5 TABLET ORAL DAILY
Qty: 30 TABLET | Refills: 1 | Status: SHIPPED | OUTPATIENT
Start: 2024-05-06

## 2024-05-06 NOTE — TELEPHONE ENCOUNTER
"----- Message from Mayuri Becker sent at 5/6/2024 11:28 AM CDT -----  Contact: pt  Pt calling about e-visit stating she cannot take medication that is being sent out for her because her dizzy and nausea.  Pt can be reached at [phone]    [facility] - [address] [place] [id number]  Phone: [phone] Fax: [phone]    Thanks,  "

## 2024-05-06 NOTE — PROGRESS NOTES
Subjective:       Patient ID: Yasmeen Ren is a 28 y.o. female.    Chief Complaint: Medication Management (Entered automatically based on patient selection in Patient Portal.)    Patient ID: Yasmeen Ren is a 28 y.o. female.    Chief Complaint: Medication Management (Entered automatically based on patient selection in Patient Portal.)    The patient initiated a request through [employer] on 5/6/2024 for evaluation and management with a chief complaint of Medication Management (Entered automatically based on patient selection in Patient Portal.)     I evaluated the questionnaire submission on 05/06/2024.    Unable to display information about patient's questionnaires because the current context does not support Rich Text Format. Please contact a  if you would like this information to display in the current context.    Chronic migraine without aura, with intractable migraine, so stated, with status migrainosus  (primary encounter diagnosis)     No orders of the defined types were placed in this encounter.     Orders Placed This Encounter      propranoloL (INDERAL) 40 MG tablet          Sig: Take 1 tablet (40 mg total) by mouth 2 (two) times daily.          Dispense:  60 tablet          Refill:  1          Order Comments: .       No follow-ups on file.      E-Visit Time Tracking:    Day 1 Time (in minutes): 7    Total Time (in minutes): 7                  No past medical history on file.    Past Surgical History:   Procedure Laterality Date    CHOLECYSTECTOMY      PINNING-CLOSED-ARM Right     TONSILLECTOMY         Family History   Problem Relation Name Age of Onset    Miscarriages / Stillbirths Mother      Hypertension Mother      Stroke Mother      Diabetes Mother      Cancer Mother         Social History     Tobacco Use    Smoking status: Some Days     Types: Vaping with nicotine    Smokeless tobacco: Never   Substance Use Topics    Alcohol use: Not Currently       There is no problem list  on file for this patient.      Immunization History   Administered Date(s) Administered    DTaP 1994, 01/09/1995, 02/24/1995, 11/27/1995, 08/25/1998    Hepatitis A, Pediatric/Adolescent, 2 Dose 08/10/2012    Hepatitis B, Pediatric/Adolescent 1995, 1995, 02/24/1995    IPV 1994, 01/09/1995, 02/24/1995, 11/27/1995, 08/25/1998    Influenza - Trivalent - PF (ADULT) 09/30/2016    MMR 08/25/1995, 08/25/1998    Meningococcal Conjugate (MCV4P) 08/10/2012    Tdap 10/05/2006, 07/28/2016    Varicella 03/26/1999, 08/10/2012           Review of Systems  Objective:   There were no vitals filed for this visit.    Physical Exam    Orders Only on 12/21/2023   Component Date Value Ref Range Status    Preg Test, Ur 12/21/2023 Negative  Negative Final   Orders Only on 12/04/2023   Component Date Value Ref Range Status    Total Bilirubin 12/04/2023 0.6  0.0 - 1.0 mg/dL Final    Bilirubin, Direct 12/04/2023 0.1  0.0 - 0.3 mg/dL Final    Bilirubin, Indirect 12/04/2023 0.5  0.0 - 0.7 mg/dL Final    AST 12/04/2023 20  15 - 37 U/L Final    ALT 12/04/2023 23  12 - 78 U/L Final    Total Protein 12/04/2023 7.8  6.4 - 8.2 g/dL Final    Albumin 12/04/2023 4.1  3.4 - 5.0 g/dL Final    Globulin 12/04/2023 3.7 (H)  2.3 - 3.5 g/dL Final    Albumin/Globulin Ratio 12/04/2023 1.108  1.1 - 1.8 Ratio Final    Alkaline Phosphatase 12/04/2023 89  46 - 116 U/L Final   Orders Only on 12/04/2023   Component Date Value Ref Range Status    Sodium 12/04/2023 138  135 - 145 mmol/L Final    Potassium 12/04/2023 3.4 (L)  3.6 - 5.2 mmol/L Final    Chloride 12/04/2023 101  100 - 108 mmol/L Final    CO2 12/04/2023 25  21 - 32 mmol/L Final    Anion Gap 12/04/2023 12.0 (H)  3.0 - 11.0 mmol/L Final    BUN 12/04/2023 11  7 - 18 mg/dL Final    Creatinine 12/04/2023 0.86  0.55 - 1.02 mg/dL Final    GFR ESTIMATION 12/04/2023 > 60  >60 Final    BUN/Creatinine Ratio 12/04/2023 12.79  7 - 18 Ratio Final    Glucose 12/04/2023  99  70 - 110 mg/dL Final    Calcium 12/04/2023 8.8  8.8 - 10.5 mg/dL Final   Orders Only on 12/04/2023   Component Date Value Ref Range Status    WBC 12/04/2023 11.8 (H)  4.6 - 10.2 10*3/uL Final    RBC 12/04/2023 5.00  4.2 - 5.4 10*6/uL Final    Hemoglobin 12/04/2023 14.8  12.0 - 16.0 g/dL Final    Hematocrit 12/04/2023 43.9  37.0 - 47.0 % Final    MCV 12/04/2023 87.8  80 - 97 fL Final    MCH 12/04/2023 29.6  27.0 - 31.2 pg Final    MCHC 12/04/2023 33.7  31.8 - 35.4 g/dL Final    RDW RBC Auto-Rto 12/04/2023 13.0  12.5 - 18.0 % Final    Platelets 12/04/2023 308  142 - 424 10*3/uL Final    Neutrophils 12/04/2023 63.7  37 - 80 % Final    Lymphocytes 12/04/2023 28.8  25 - 40 % Final    Monocytes 12/04/2023 5.8  1 - 15 % Final    Eosinophils 12/04/2023 0.8 (L)  1 - 3 % Final    Basophils 12/04/2023 0.5  0 - 3 % Final    nRBC# 12/04/2023 0.0  % Final    Neutrophils Absolute 12/04/2023 7.52  1.8 - 7.7 10*3/uL Final         Assessment:      No diagnosis found.      Plan:     There are no diagnoses linked to this encounter.     Current Outpatient Medications   Medication Sig Dispense Refill    ipratropium (ATROVENT) 42 mcg (0.06 %) nasal spray       ketoconazole (NIZORAL) 2 % cream       levocetirizine (XYZAL) 5 MG tablet       promethazine (PHENERGAN) 25 MG tablet       sumatriptan (IMITREX) 50 MG tablet Take 1 tablet (50 mg total) by mouth daily as needed for Migraine (may repeat in 2 hours if needed). 9 tablet 1     No current facility-administered medications for this visit.       There are no discontinued medications.    Health Maintenance   Topic Date Due    Hepatitis C Screening  Never done    Lipid Panel  Never done    Pap Smear  Never done    TETANUS VACCINE  07/28/2026       There are no Patient Instructions on file for this visit.    Risks, benefits, and alternatives discussed with patient, Patient verbalized understanding of discussed plan of care. Asked patient if any further  questions, answered no.    Future Appointments   Date Time Provider Department Center   5/27/2024  3:00 PM Kavita Reaves, NP [facility]              Kavita Reaves NP

## 2024-05-06 NOTE — TELEPHONE ENCOUNTER
S/minal pascal at [facility]'s office. She states pt was supposed to bring medical records to their office and never showed.

## 2024-05-06 NOTE — TELEPHONE ENCOUNTER
Please let Ms. Ren know I have ordered lexapro for her to start taking once daily to help with the anxiety and depression. Thanks.

## 2024-05-27 ENCOUNTER — TELEPHONE (OUTPATIENT)
Dept: PRIMARY CARE CLINIC | Facility: CLINIC | Age: 29
End: 2024-05-27

## 2024-05-27 NOTE — TELEPHONE ENCOUNTER
----- Message from Kandice Mendez sent at 5/27/2024 11:15 AM CDT -----   Patient is requesting call back in regards to visit patient having transportation issue please call her back at [phone]

## 2024-07-23 ENCOUNTER — TELEPHONE (OUTPATIENT)
Dept: OBSTETRICS AND GYNECOLOGY | Facility: CLINIC | Age: 29
End: 2024-07-23
Payer: MEDICAID